# Patient Record
Sex: FEMALE | Race: WHITE
[De-identification: names, ages, dates, MRNs, and addresses within clinical notes are randomized per-mention and may not be internally consistent; named-entity substitution may affect disease eponyms.]

---

## 2018-08-04 ENCOUNTER — HOSPITAL ENCOUNTER (INPATIENT)
Dept: HOSPITAL 68 - CBCO | Age: 28
LOS: 3 days | End: 2018-08-07
Attending: OBSTETRICS & GYNECOLOGY | Admitting: OBSTETRICS & GYNECOLOGY
Payer: COMMERCIAL

## 2018-08-04 VITALS — HEIGHT: 60 IN | WEIGHT: 160 LBS | BODY MASS INDEX: 31.41 KG/M2

## 2018-08-04 DIAGNOSIS — Z3A.39: ICD-10-CM

## 2018-08-04 LAB
ABSOLUTE GRANULOCYTE CT: 7.5 /CUMM (ref 1.4–6.5)
BASOPHILS # BLD: 0 /CUMM (ref 0–0.2)
BASOPHILS NFR BLD: 0.2 % (ref 0–2)
EOSINOPHIL # BLD: 0.1 /CUMM (ref 0–0.7)
EOSINOPHIL NFR BLD: 1.3 % (ref 0–5)
ERYTHROCYTE [DISTWIDTH] IN BLOOD BY AUTOMATED COUNT: 13.2 % (ref 11.5–14.5)
GRANULOCYTES NFR BLD: 80.5 % (ref 42.2–75.2)
HCT VFR BLD CALC: 37.8 % (ref 37–47)
LYMPHOCYTES # BLD: 1.1 /CUMM (ref 1.2–3.4)
MCH RBC QN AUTO: 30.4 PG (ref 27–31)
MCHC RBC AUTO-ENTMCNC: 33.6 G/DL (ref 33–37)
MCV RBC AUTO: 90.5 FL (ref 81–99)
MONOCYTES # BLD: 0.6 /CUMM (ref 0.1–0.6)
PLATELET # BLD: 253 /CUMM (ref 130–400)
PMV BLD AUTO: 7.7 FL (ref 7.4–10.4)
RED BLOOD CELL CT: 4.18 /CUMM (ref 4.2–5.4)
WBC # BLD AUTO: 9.3 /CUMM (ref 4.8–10.8)

## 2018-08-04 NOTE — PN- OBGYN
Surgical Brief Attending Note
Brief Attending Note:
pt is comfortable with epidural, no complaints. she received 2 doses of PCN so 
far
on TOCO: ctxs q 4-5 min, FHR baseline 130, moderate variability
cervix 6cm/90%/-2, AROM, meconium stained fluid
will start pitocin augmentation, monitor closely

## 2018-08-04 NOTE — HISTORY & PHYSICAL
General Information and HPI
MD Statement:
I have seen and personally examined RADHA COLÓN and documented this H&P.
 
The patient is a 28 year old female at [39] weeks and [3] days gestation who 
presented with a chief complaint of [ctx].
 
Source of Information: patient
Exam Limitations: no limitations
History of Present Illness:
27yo, 39 3/7wks, c/o ctxs since am, getting stronger and closer, came in for
evaluation,denies VB or LOF, reports GFM. initial exam was 3 cm, then cervical 
dilation  progress to 4cm.
Prenatal acre started at 11 wks, uncomplicated thus far. GBS positive. 
 
Allergies/Medications
Allergies:
Coded Allergies:
No Known Allergies (18)
 
Home Med list
Pnv No.122/Iron/Folic Acid (Prenatal Multi Tablet) 27 MG IRON-800 MCG TABLET   1
TAB PO DAILY HEALTH SUPPLEMENT  (Reported)
 
Compliance With Home Meds: GOOD
 
Past History
 
OB/Gyn History
: 2
Para: 0
Last Menstrual Period:
unknown
Estimated Delivery Date:
2018
Past OB/Gyn History: non-contributory
 
Medical History
Blood Transfusion Hx: No
Neurological: NONE
EENT: NONE
Cardiovascular: NONE
Respiratory: NONE
Gastrointestinal: NONE
Hepatic: NONE
Renal: NONE
Musculoskeletal: NONE
Psychiatric: NONE
Endocrine: NONE
Blood Disorders: NONE
Cancer(s): NONE
GYN/Reproductive: NONE
 
Surgical History
Pertinent Surgical History: hernia repair 2001
 
Past Family/Social History
 
Psychosocial History
Smoking Status: Never Smoked
ETOH Use: denies use
Illicit Drug Use: denies illicit drug use
 
Review of Systems
 
Review of Systems
Constitutional:
Reports: no symptoms. 
EENTM:
Reports: no symptoms. 
Cardiovascular:
Reports: no symptoms. 
Respiratory:
Reports: no symptoms. 
GI:
Reports: no symptoms. 
Genitourinary:
Reports: see HPI. 
Musculoskeletal:
Reports: no symptoms. 
Skin:
Reports: no symptoms. 
Neurological/Psychological:
Reports: no symptoms. 
Hematologic/Endocrine:
Reports: no symptoms. 
Immunologic/Allergic:
Reports: no symptoms. 
All Other Systems: Reviewed and Negative
 
Exam & Diagnostic Data
Last 24 Hrs of Vital Signs/I&O
 Intake & Output
 
 
  1600 08/04 0800 08/04 0000
 
Intake Total   
 
Output Total   
 
Balance   
 
    
 
Patient 72.575 kg  
 
Weight   
 
 
 
 
Obstetric Exam
Wgt Gained During Pregnancy:
35lbs
Pelvimetry:
adequate
Dilation (cm): 6
Effacement (%): 90
Station: -1
Membranes: intact
Fluid: unknown
Fundal Height (cm): 39
Multiple Gestation? No
Contractions:
q3-4 min
Infant #1 -
   FHR Baseline: 170
   Category: 1
   Estimated Fetal Weight:
3200g
   Fetal Presentation:
vertex
Patient for Induction? No
Physical Exam:
VSS
General: NAD
Abdomen: gravid, soft, nontender
Ext: DCT (-)
 
Prenatal Labs
Blood Type & Rh:
A positive
Antibody Screen:
negative
Hct/Hgb & Platelets #1:
13.1/39.1%,PLT 839751
Hct/Hgb & Platelets #2:
12.1/38%,PLT 952540
Rubella:
not immune
VDRL #1:
negative
VDRL #2:
negative
HbsAg:
negative
HIV #1:
negative
HIV #2
negative
1 Hr P
Group B Strep:
positive
Initial Ultrasound:
IUP at 11 wks
Anatomy Ultrasound:
nl
Genetic Testing:
nl
Last 24 Hrs of Labs/Talib:
 Laboratory Tests
 
18 1545:
CBC w Diff NO MAN DIFF REQ, RBC 4.18  L, MCV 90.5, MCH 30.4, MCHC 33.6, RDW 13.2
, MPV 7.7, Gran % 80.5  H, Lymphocytes % 11.8  L, Monocytes % 6.2, Eosinophils %
1.3, Basophils % 0.2, Absolute Granulocytes 7.5  H, Absolute Lymphocytes 1.1  L,
Absolute Monocytes 0.6, Absolute Eosinophils 0.1, Absolute Basophils 0
 
18 1345:
Urine Color YEL, Urine Clarity CLEAR, Urine pH 7.0, Ur Specific Gravity 1.015, 
Urine Protein NEG, Urine Ketones NEG, Urine Nitrite NEG, Urine Bilirubin NEG, 
Urine Urobilinogen 0.2, Ur Leukocyte Esterase NEG, Ur Microscopic EXAM NOT 
REQUIRED, Urine Hemoglobin NEG, Urine Glucose NEG
 
 
Assessment/Plan
Assessment/Plan:
27yo,  39 3/7wks labor
1. admit pt, admission labs
2. antibiotic fdor GBS prophylaxis
3. pain management as needed
4. will mon itor closely
 
As Ranked By This Provider
Problem List:
 1. Pregnancy
 
 
Core Measures
 
Venous Thromboembolism
VTE Risk Factors Pregnancy/Postpartum
No Mechanical VTE Prophylaxis d/t LowRisk-No Interven Req'd
No VTE Pharm Prophylaxis d/t LowRisk-No Interven Req'd
 
Attending MD Review Statement
 
Attending Statement
Attending MD Statement: examined this patient, discussed with family, discussed 
w/nursing

## 2018-08-05 PROCEDURE — 0KQM0ZZ REPAIR PERINEUM MUSCLE, OPEN APPROACH: ICD-10-PCS | Performed by: OBSTETRICS & GYNECOLOGY

## 2018-08-05 NOTE — LABOR & DELIVERY SUMMARY
Delivery Summary
Vaginal Delivery:
   Vaginal: vertex
Episiotomy/Lacerations:
   Episiotomy/Lacerations: 2ND DEGREE
   Type:
2ND DEGREE
   Repair:
3-0 vicryo
   Anesthesia:
epidural, local
Placenta:
   Placenta: spontanteous, normal, 3 vessel, nuchal cord (x_) (tight nuchal cord
x1)
Anesthesia: epidural
Baby's Weight:
6lb13 oz
Apgars - 1 Min: 8
Apgars - 5 Min: 9
Additional Comments:
Patient fully dilated, pushed well, spontaneously delivered a viable male infant
in cephalic presentation, LY position, head delivered atraumatically, tight 
nuchal cord 1, clamped and cut, shoulder and rest of the body delivered without
difficulty, baby cried and put on mother's chest, pediatrician present at 
delivery.  Placenta delivered spontaneously, intact, three-vessel cord.  Second-
degree laceration repaired with 3-0 Vicryl using standard technique.   
mL.
Patient tolerated the procedure well, instruments, lap and needle counts were 
correct.  She is in recovery room in stable condition.

## 2018-08-06 LAB
ABSOLUTE GRANULOCYTE CT: 4.3 /CUMM (ref 1.4–6.5)
BASOPHILS # BLD: 0 /CUMM (ref 0–0.2)
BASOPHILS NFR BLD: 0.6 % (ref 0–2)
EOSINOPHIL # BLD: 0.1 /CUMM (ref 0–0.7)
EOSINOPHIL NFR BLD: 1.8 % (ref 0–5)
ERYTHROCYTE [DISTWIDTH] IN BLOOD BY AUTOMATED COUNT: 13.2 % (ref 11.5–14.5)
GRANULOCYTES NFR BLD: 64.3 % (ref 42.2–75.2)
HCT VFR BLD CALC: 32.8 % (ref 37–47)
LYMPHOCYTES # BLD: 1.9 /CUMM (ref 1.2–3.4)
MCH RBC QN AUTO: 31 PG (ref 27–31)
MCHC RBC AUTO-ENTMCNC: 33.8 G/DL (ref 33–37)
MCV RBC AUTO: 91.9 FL (ref 81–99)
MONOCYTES # BLD: 0.4 /CUMM (ref 0.1–0.6)
PLATELET # BLD: 197 /CUMM (ref 130–400)
PMV BLD AUTO: 7.4 FL (ref 7.4–10.4)
RED BLOOD CELL CT: 3.57 /CUMM (ref 4.2–5.4)
WBC # BLD AUTO: 6.7 /CUMM (ref 4.8–10.8)

## 2018-08-06 NOTE — PN- OBGYN
Surgical Brief Attending Note
Brief Attending Note:
PPD1
 
Patient happy, in good spirits, just has "pain down there" from episiotomy and 
hemorrhoids.
+void. Ambulating.  Tolerating POs.  Would like a stool softener.
afebrile, VS normal
Abd - soft, NT
Fundus - firm, NT
Perineum - intact, dry, 
              hemorrhoids swollen, soft
Extr - benign
A:  stable postpartum
     swollen painful hemorrhoids
P:  routine postpartum care
     stool softener BID until BM, Anusol HC
     Pt. requesting circumcision for her  son.  Risks, benefits, purpose,
and alternatives d/w pt. questions answered and informed consent obtained.

## 2018-08-07 NOTE — PN- OBGYN
Surgical Brief Attending Note
Brief Attending Note:
ppd#2
pt is resting in bed, no complaints.
PE: VSS
General: NAD
CV RRR
Lungs CTA B/L
Abdomen: soft, nontender, uterus firm, fundus below umbilicus, lochia mild
Ext: DCT (-)
A/P: 27yo, s/p , PPD #2
1. encourage ambulation and breast feeding
2. RT PP care
3. will d/c home, f/u in office in 2 wks and 6 wks, discharge instructions given
, she understand.